# Patient Record
Sex: MALE | Race: WHITE | NOT HISPANIC OR LATINO | Employment: FULL TIME | URBAN - METROPOLITAN AREA
[De-identification: names, ages, dates, MRNs, and addresses within clinical notes are randomized per-mention and may not be internally consistent; named-entity substitution may affect disease eponyms.]

---

## 2017-02-15 ENCOUNTER — OFFICE VISIT (OUTPATIENT)
Dept: FAMILY MEDICINE CLINIC | Facility: CLINIC | Age: 27
End: 2017-02-15

## 2017-02-15 VITALS
HEIGHT: 76 IN | BODY MASS INDEX: 25.82 KG/M2 | TEMPERATURE: 98.2 F | OXYGEN SATURATION: 95 % | DIASTOLIC BLOOD PRESSURE: 62 MMHG | HEART RATE: 57 BPM | WEIGHT: 212 LBS | SYSTOLIC BLOOD PRESSURE: 140 MMHG

## 2017-02-15 DIAGNOSIS — K21.00 GASTROESOPHAGEAL REFLUX DISEASE WITH ESOPHAGITIS: Primary | ICD-10-CM

## 2017-02-15 PROCEDURE — 99213 OFFICE O/P EST LOW 20 MIN: CPT | Performed by: FAMILY MEDICINE

## 2017-03-15 ENCOUNTER — TELEPHONE (OUTPATIENT)
Dept: FAMILY MEDICINE CLINIC | Facility: CLINIC | Age: 27
End: 2017-03-15

## 2017-03-15 NOTE — TELEPHONE ENCOUNTER
----- Message from Rahul Pulliam MD sent at 3/15/2017  1:12 PM EDT -----  Contact: PATIENT [phone]  If you have had chickenpox you can get the vaccine  ----- Message -----     From: Elle Otto MA     Sent: 3/15/2017  12:58 PM       To: Rahul Pulliam MD        ----- Message -----     From: Sienna Reeder     Sent: 3/15/2017  12:37 PM       To: Elle Otto MA    PATIENT HAS NEVER HAD THE CHICKEN POX  VACCINE BUT HAD THE CHICKEN POX. CAN PATIENT GET THE CHICKEN POX VACCINE     lvm informing pt to call back in regards to message

## 2017-03-15 NOTE — TELEPHONE ENCOUNTER
----- Message from Rahul Pulliam MD sent at 3/15/2017  1:12 PM EDT -----  Contact: PATIENT [phone]  If you have had chickenpox you can get the vaccine  ----- Message -----     From: Elle Otto MA     Sent: 3/15/2017  12:58 PM       To: Rahul Pulliam MD        ----- Message -----     From: Sienna Reeder     Sent: 3/15/2017  12:37 PM       To: Elle Otto MA    PATIENT HAS NEVER HAD THE CHICKEN POX  VACCINE BUT HAD THE CHICKEN POX. CAN PATIENT GET THE CHICKEN POX VACCINE     Informed pt

## 2017-03-28 ENCOUNTER — TELEPHONE (OUTPATIENT)
Dept: FAMILY MEDICINE CLINIC | Facility: CLINIC | Age: 27
End: 2017-03-28
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
## 2017-03-31 ENCOUNTER — OFFICE VISIT (OUTPATIENT)
Dept: FAMILY MEDICINE CLINIC | Facility: CLINIC | Age: 27
End: 2017-03-31

## 2017-03-31 VITALS
OXYGEN SATURATION: 98 % | BODY MASS INDEX: 26.3 KG/M2 | HEIGHT: 76 IN | HEART RATE: 69 BPM | DIASTOLIC BLOOD PRESSURE: 78 MMHG | WEIGHT: 216 LBS | SYSTOLIC BLOOD PRESSURE: 118 MMHG | TEMPERATURE: 98.5 F

## 2017-03-31 DIAGNOSIS — Z71.84 TRAVEL ADVICE ENCOUNTER: Primary | ICD-10-CM

## 2017-03-31 DIAGNOSIS — Z86.19 HISTORY OF CHICKENPOX: ICD-10-CM

## 2017-03-31 PROCEDURE — 99213 OFFICE O/P EST LOW 20 MIN: CPT | Performed by: NURSE PRACTITIONER

## 2017-03-31 RX ORDER — MEFLOQUINE HYDROCHLORIDE 250 MG/1
250 TABLET ORAL
Qty: 15 TABLET | Refills: 0 | Status: SHIPPED | OUTPATIENT
Start: 2017-03-31

## 2017-03-31 RX ORDER — CIPROFLOXACIN 500 MG/1
500 TABLET, FILM COATED ORAL 2 TIMES DAILY
Qty: 20 TABLET | Refills: 0 | Status: SHIPPED | OUTPATIENT
Start: 2017-03-31

## 2017-03-31 NOTE — PATIENT INSTRUCTIONS
forms completed for immunization record, if needs meningococcal RTC immunization only, check varicella titer today, make copies of prev immunization record will scan in, erx mefloquine 250 mg weekly start 2 wks prior and continue until 4 wks after returning, erx cipro 500 mg prn GI upset, urin pain, URI sx or typhoid fever exposure, enc see if can get vaccine at local pharmacy

## 2017-03-31 NOTE — PROGRESS NOTES
Subjective   Balbir Lizarraga is a 27 y.o. male.     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 and needs immunization form completed and varicella titer drawn, with hx of chickenpox as a child, hx of shingles as well, no physical needed, generally healthy, no daily meds, no complaints today  Planning to go to [place] in May 15-June 29, has been to [place] prev and takes mefloquine 250 mg weekly for malaria prophylaxis request refill today, has taken and tolerated in past, also request medication to help if gets sick abroad possible exposure to typhoid fever in [place], has called [employer] travel clinic but too expensive to see provider before leave, no hx of typhoid fever vaccine, NKDA    The following portions of the patient's history were reviewed and updated as appropriate: allergies, current medications, past family history, past medical history, past social history, past surgical history and problem list.    Review of Systems   Constitutional: Negative for fever.   Eyes: Negative for visual disturbance.   Respiratory: Negative for cough, shortness of breath and wheezing.    Cardiovascular: Negative for chest pain, palpitations and leg swelling.   Neurological: Negative for dizziness and headaches.   All other systems reviewed and are negative.      Objective   Physical Exam   Constitutional: He is oriented to person, place, and time. He appears well-developed and well-nourished.   HENT:   Head: Normocephalic and atraumatic.   Eyes: Conjunctivae and EOM are normal. Pupils are equal, round, and reactive to light.   Cardiovascular: Normal rate, regular rhythm and normal heart sounds.    Pulmonary/Chest: Effort normal and breath sounds normal.   Musculoskeletal: Normal range of motion.   Neurological: He is alert and oriented to person, place, and time.   Skin: Skin is warm and dry.   Psychiatric: He has a normal mood and affect. His  behavior is normal. Judgment and thought content normal.   Vitals reviewed.      Assessment/Plan   Balbir was seen today for annual exam.    Diagnoses and all orders for this visit:    Travel advice encounter    History of chickenpox  -     Varicella Zoster Antibody, IgG    Other orders  -     mefloquine (LARIAM) 250 MG tablet; Take 1 tablet by mouth Every 7 (Seven) Days.  -     ciprofloxacin (CIPRO) 500 MG tablet; Take 1 tablet by mouth 2 (Two) Times a Day.    forms completed for immunization record, if needs meningococcal RTC immunization only, check varicella titer today, make copies of prev immunization record will scan in, erx mefloquine 250 mg weekly start 2 wks prior and continue until 4 wks after returning, erx cipro 500 mg prn GI upset, urin pain, URI sx or typhoid fever exposure, enc see if can get vaccine at local pharmacy

## 2017-04-01 LAB — VZV IGG SER IA-ACNC: 1948 INDEX

## 2017-04-03 ENCOUNTER — TELEPHONE (OUTPATIENT)
Dept: FAMILY MEDICINE CLINIC | Facility: CLINIC | Age: 27
End: 2017-04-03

## 2017-04-03 NOTE — TELEPHONE ENCOUNTER
Pt informed    ---- Message from SWATI Silverio sent at 4/3/2017  8:25 AM EDT -----  Immune to chickenpox, he can print lab result or we can mail

## 2021-04-16 ENCOUNTER — BULK ORDERING (OUTPATIENT)
Dept: CASE MANAGEMENT | Facility: OTHER | Age: 31
End: 2021-04-16

## 2021-04-16 DIAGNOSIS — Z23 IMMUNIZATION DUE: ICD-10-CM
